# Patient Record
Sex: MALE | Race: BLACK OR AFRICAN AMERICAN
[De-identification: names, ages, dates, MRNs, and addresses within clinical notes are randomized per-mention and may not be internally consistent; named-entity substitution may affect disease eponyms.]

---

## 2018-04-05 ENCOUNTER — HOSPITAL ENCOUNTER (EMERGENCY)
Dept: HOSPITAL 89 - ER | Age: 28
Discharge: HOME | End: 2018-04-05
Payer: SELF-PAY

## 2018-04-05 VITALS — SYSTOLIC BLOOD PRESSURE: 134 MMHG | DIASTOLIC BLOOD PRESSURE: 90 MMHG

## 2018-04-05 DIAGNOSIS — F17.210: ICD-10-CM

## 2018-04-05 DIAGNOSIS — J40: ICD-10-CM

## 2018-04-05 DIAGNOSIS — J45.21: Primary | ICD-10-CM

## 2018-04-05 PROCEDURE — 94640 AIRWAY INHALATION TREATMENT: CPT

## 2018-04-05 PROCEDURE — 99283 EMERGENCY DEPT VISIT LOW MDM: CPT

## 2018-04-05 NOTE — ER REPORT
History and Physical


Time Seen By MD:  18:06


HPI/ROS


CHIEF COMPLAINT: Difficulty breathing, history of asthma





HISTORY OF PRESENT ILLNESS: 27-year-old black male smoker one half pack per day 

presents ambulatory to the ER complaining of severe cough for one day.  He 

notes productive of colored sputum.  He's had no fever.  He denies exposure to 

ill contacts.  He admits he is out of his inhaler.  Patient denies chest pain, 

leg swelling or calf pain.





REVIEW OF SYSTEMS:


Respiratory: As above


Cardiovascular: No chest pain, no palpitations.


Gastrointestinal: No vomiting, no abdominal pain.


Musculoskeletal: No back pain.


Allergies:  


Coded Allergies:  


     No Known Drug Allergies (Unverified , 4/5/18)


Home Meds


Active Scripts


Albuterol Sulfate 90 Mcg/Act (PROAIR HFA 90 MCG/ACT) 8.5 Gm Hfa.aer.ad, 2 PUFF 

IH Q4-6H Y for asthma symptoms, #1 INHALER 1 Refill


   Prov:CHAITANYA LEE          4/5/18


Prednisone 10 Mg Tab (PREDNISONE 10 MG TAB) 10 Mg Tablet, 10 MG PO QDAY Y for 

reduce asthma flare, #9


   2 tabs daily for 3 days


   1 tab daily for 3 days


   Prov:CHAITANYA LEE          4/5/18


Amoxicillin (AMOXICILLIN) 875 Mg Tablet, 1 TAB PO Q12H for infection, #14 TAB


   Prov:CHAITANYA LEE          4/5/18


Reviewed Nurses Notes:  Yes


Old Medical Records Reviewed:  Yes


Constitutional





Vital Sign - Last 24 Hours








 4/5/18 4/5/18 4/5/18 4/5/18





 18:08 18:21 18:21 18:27


 


Temp 98.9   


 


Pulse 72  71 83


 


Resp 20  12 12


 


B/P (MAP) 134/90   


 


Pulse Ox 97 96  


 


O2 Delivery Room Air Room Air  








Physical Exam


  Vital signs stable, afebrile, pulse ox normal


General Appearance: The patient is alert, has no immediate need for airway 

protection and no current signs of toxicity.  Mild respiratory distress


HEENT: Pupils equal and round no injection.  TMs normal, oropharynx with 

moderate erythema, no exudate


Respiratory: Chest is non tender, lungs are clear to auscultation.  Faint 

expiratory wheezing, no rhonchi or rails


Cardiac: regular rate and rhythm


Gastrointestinal: Abdomen is soft and non tender, no masses, bowel sounds 

normal.


Musculoskeletal:  Neck: Neck is supple and non tender.  No lymphadenopathy


Extremities have full range of motion and are non tender.


Skin: No rashes or lesions.





DIFFERENTIAL DIAGNOSIS: After history and physical exam differential diagnosis 

was considered for shortness of breath including but not limited to pulmonary 

infectious process, COPD, asthma, pulmonary embolus and congestive heart 

failure.





Medical Decision Making


ED Course/Re-evaluation


ED Course


Patient was admitted to an examination room.  H&P was done.  The differential 

diagnoses was considered.  He was treated with an DuoNeb.  He reports feeling 

better.  He is given prednisone 40 mg by mouth.  He is discharged home on 

amoxicillin 875 BY mouth twice a day, prednisone taper 20 g 3, 10 mg 3.  

Patient's dispensed.  Patient was given a prescription for a pro-air inhaler.  

Patient advised to follow-up with primary care if unimproved in 3-5 days.


Decision to Disposition Date:  Apr 5, 2018


Decision to Disposition Time:  18:16





Depart


Departure


Latest Vital Signs





Vital Signs








  Date Time  Temp Pulse Resp B/P (MAP) Pulse Ox O2 Delivery O2 Flow Rate FiO2


 


4/5/18 18:27  83 12     


 


4/5/18 18:21     96 Room Air  


 


4/5/18 18:08 98.9   134/90    








Impression:  


 Primary Impression:  


 Bronchitis


 Additional Impressions:  


 Asthma exacerbation


 Tobacco dependence syndrome


Condition:  Improved


Disposition:  HOME OR SELF-CARE


Referrals:  


SANA SMITH MD, FARRUKH MD


New Scripts


Albuterol Sulfate 90 Mcg/Act (PROAIR HFA 90 MCG/ACT) 8.5 Gm Hfa.aer.ad


2 PUFF IH Q4-6H Y for asthma symptoms, #1 INHALER 1 Refill


   Prov: CHAITANYA LEE DO         4/5/18 


Prednisone 10 Mg Tab (PREDNISONE 10 MG TAB) 10 Mg Tablet


10 MG PO QDAY Y for reduce asthma flare, #9


   2 tabs daily for 3 days


   1 tab daily for 3 days


   Prov: CHAITANYA LEE DO         4/5/18 


Amoxicillin (AMOXICILLIN) 875 Mg Tablet


1 TAB PO Q12H for infection, #14 TAB


   Prov: CHAITANYA LEE DO         4/5/18


Patient Instructions:  Acute Bronchitis (ED), Asthma (ED)





Additional Instructions:  


Use medications as prescribed


Follow-up with primary care to get help with discontinuing smoking


Follow-up with primary care if unimproved in 3-5 days





Problem Qualifiers








 Additional Impressions:  


 Asthma exacerbation


 Asthma severity:  mild  Asthma persistence:  intermittent  Qualified Codes:  

J45.21 - Mild intermittent asthma with (acute) exacerbation








CHAITANYA LEE DO Apr 5, 2018 18:07